# Patient Record
Sex: FEMALE | Race: WHITE | URBAN - METROPOLITAN AREA
[De-identification: names, ages, dates, MRNs, and addresses within clinical notes are randomized per-mention and may not be internally consistent; named-entity substitution may affect disease eponyms.]

---

## 2020-01-30 ENCOUNTER — OFFICE VISIT (OUTPATIENT)
Dept: ENDOCRINOLOGY | Age: 30
End: 2020-01-30

## 2020-01-30 VITALS
BODY MASS INDEX: 27.32 KG/M2 | SYSTOLIC BLOOD PRESSURE: 106 MMHG | RESPIRATION RATE: 16 BRPM | DIASTOLIC BLOOD PRESSURE: 61 MMHG | WEIGHT: 170 LBS | HEART RATE: 86 BPM | OXYGEN SATURATION: 100 % | HEIGHT: 66 IN

## 2020-01-30 DIAGNOSIS — E03.9 ACQUIRED HYPOTHYROIDISM: Primary | ICD-10-CM

## 2020-01-30 RX ORDER — NITROFURANTOIN 25; 75 MG/1; MG/1
CAPSULE ORAL
COMMUNITY
End: 2020-01-30

## 2020-01-30 RX ORDER — CITALOPRAM 20 MG/1
TABLET, FILM COATED ORAL
COMMUNITY
End: 2020-01-30

## 2020-01-30 RX ORDER — LEVOTHYROXINE SODIUM 50 UG/1
TABLET ORAL
COMMUNITY
Start: 2020-01-20 | End: 2020-11-09

## 2020-01-30 NOTE — PROGRESS NOTES
Chief Complaint   Patient presents with    Thyroid Problem     Pharmacy veriifed: [facility]    New Patient     History of Present Illness: Darlin Leiva is a 26 y.o. female who I was asked to see in consult by Dr. Yumiko Villanueva for evaluation of hypothyroidism. She was previously seen by Dr. Corby Darby (will request records from Dr. Lily Cordero). \"After I had my son in [address] [place] [address] I started to have hair loss and weight loss, they tested my thyroid and I had hyperthyroidism. I was hyperthyroid for a while then it switched to hypothyroidism. They tested me for Hashimoto's which was positive. They put me on Synthroid, which I have been taking for about 5 months. After I was started on the Synthroid I started to sleep better and my BM were better. I then started to have issues of foggy headedness and anxiety, but my doctor said it was not related so I kept taking it\". Her last set of TFTs was 9/25/19 and her TSH was 1.42 with FT4 of 1.41. She is taking Synthroid 50mcg daily. She notes that she has been on the Synthroid 50mcg the whole time. Prior to starting the Synthroid her only issue was sluggish bowel movements and not sleeping well. She denies issues of CP, SOB, palpitations, tremors, diarrhea, constipation, heat or cold intolerance. She denies issues of dysphagia, dysphonia, or chocking issues. Prior to January 2018 she did not have prior thyroid issues. She notes that her mother has \"thyroid issues\". Now that she is on the Synthroid she notes that she has been feeling foggy headed with Kelsea Shook out of it feeling\" and increased sensation of anxiety. She denies issues of weight gain or weight loss. Her LMP was 1/10/2020, she notes her cycle is regular in timing and duration. Past Medical History:   Diagnosis Date    Thyroiditis, chronic, lymphocytic      History reviewed. No pertinent surgical history.   Current Outpatient Medications   Medication Sig    SYNTHROID 50 mcg tablet TAKE 1 TABLET BY MOUTH EVERY DAY     No current facility-administered medications for this visit. No Known Allergies  Family History   Problem Relation Age of Onset    Thyroid Disease Mother     No Known Problems Father     No Known Problems Brother     Cancer Maternal Grandmother         Lung     Social History     Socioeconomic History    Marital status: UNKNOWN     Spouse name: Not on file    Number of children: Not on file    Years of education: Not on file    Highest education level: Not on file   Occupational History    Not on file   Social Needs    Financial resource strain: Not on file    Food insecurity:     Worry: Not on file     Inability: Not on file    Transportation needs:     Medical: Not on file     Non-medical: Not on file   Tobacco Use    Smoking status: Former Smoker     Last attempt to quit:      Years since quittin.0    Smokeless tobacco: Never Used   Substance and Sexual Activity    Alcohol use: Yes     Alcohol/week: 1.0 standard drinks     Types: 1 Glasses of wine per week     Comment: weekly    Drug use: Never    Sexual activity: Not on file   Lifestyle    Physical activity:     Days per week: Not on file     Minutes per session: Not on file    Stress: Not on file   Relationships    Social connections:     Talks on phone: Not on file     Gets together: Not on file     Attends Jainism service: Not on file     Active member of club or organization: Not on file     Attends meetings of clubs or organizations: Not on file     Relationship status: Not on file    Intimate partner violence:     Fear of current or ex partner: Not on file     Emotionally abused: Not on file     Physically abused: Not on file     Forced sexual activity: Not on file   Other Topics Concern    Not on file   Social History Narrative    Not on file     Review of Systems:  - Negative except as noted in HPI    Physical Examination:  Blood pressure 106/61, pulse 86, resp.  rate 16, height 5' 6\" (1.676 m), weight 170 lb (77.1 kg), SpO2 100 %. - General: pleasant, no distress, good eye contact  - HEENT: no exopthalmos, no periorbital edema, no scleral/conjunctival injection, EOMI, no lid lag or stare  - Neck: supple, no thyromegaly, masses, lymph nodes, or carotid bruits, no supraclavicular or dorsocervical fat pads  - Cardiovascular: regular, normal rate, normal S1 and S2, no murmurs/rubs/gallops   - Respiratory: clear to auscultation bilaterally  - Gastrointestinal: soft, nontender, nondistended, no masses, no hepatosplenomegaly  - Musculoskeletal: no proximal muscle weakness in upper or lower extremities  - Integumentary: no acanthosis nigricans, no rashes, no edema  - Neurological: reflexes 2+ at biceps, no tremor  - Psychiatric: normal mood and affect    Data Reviewed:   (See HPI and scanned lab documents)    Assessment/Plan:   1. Acquired hypothyroidism    1) Pt reports that since being started on the Synthroid 50mcg she has been experiencing anxiety and \"foggy headedness\". We discussed that yes her TFTs in September were in a good normal range, but she is still having symptoms that started after she started the Synthroid. Will check TFTs today and we discussed possibly decreasing her dose or trying her off the Synthroid since her only symptoms were constipation and trouble sleeping, which have resolved. Pt voices understanding and agreement with the plan. RTC 3 months      Follow-up and Dispositions    · Return in about 3 months (around 4/30/2020).          Copy sent to:  Dr. Patricia Potter

## 2020-01-30 NOTE — LETTER
1/30/2020 10:13 AM 
 
Patient:  Nate Guerrero YOB: 1990 Date of Visit: 1/30/2020 Dear Beryle Gelineau, MD, [id number] [facility] [address] [id number] 
 : Thank you for referring Ms. Nate Guerrero to me for evaluation/treatment. Below are the relevant portions of my assessment and plan of care. If you have questions, please do not hesitate to call me. I look forward to following Ms. Chun Mcgill along with you. Sincerely, Eric Green MD

## 2020-01-31 LAB
FT4I SERPL CALC-MCNC: 2 (ref 1.2–4.9)
T3RU NFR SERPL: 29 % (ref 24–39)
T4 FREE SERPL-MCNC: 1.45 NG/DL (ref 0.82–1.77)
T4 SERPL-MCNC: 6.9 UG/DL (ref 4.5–12)
TSH SERPL DL<=0.005 MIU/L-ACNC: 1.32 UIU/ML (ref 0.45–4.5)

## 2020-01-31 NOTE — PROGRESS NOTES
Spoke with pt regarding her TFTs. Pt instructed to stop her Synthroid 50mcg for the next three months. Pt voices understanding and agreement with the plan.

## 2020-04-30 DIAGNOSIS — E03.9 ACQUIRED HYPOTHYROIDISM: ICD-10-CM

## 2020-05-07 LAB
FT4I SERPL CALC-MCNC: 1.8 (ref 1.2–4.9)
T3RU NFR SERPL: 28 % (ref 24–39)
T4 FREE SERPL-MCNC: 1.2 NG/DL (ref 0.82–1.77)
T4 SERPL-MCNC: 6.4 UG/DL (ref 4.5–12)
TSH SERPL DL<=0.005 MIU/L-ACNC: 3.13 UIU/ML (ref 0.45–4.5)

## 2020-05-13 ENCOUNTER — VIRTUAL VISIT (OUTPATIENT)
Dept: ENDOCRINOLOGY | Age: 30
End: 2020-05-13

## 2020-05-13 DIAGNOSIS — E03.9 ACQUIRED HYPOTHYROIDISM: Primary | ICD-10-CM

## 2020-05-13 RX ORDER — BISMUTH SUBSALICYLATE 262 MG
1 TABLET,CHEWABLE ORAL DAILY
COMMUNITY
End: 2020-11-09

## 2020-05-13 NOTE — PROGRESS NOTES
Chief Complaint   Patient presents with    Thyroid Problem     pcp and pharmacy verified . DOXY. ME            **THIS IS A VIRTUAL VISIT VIA A VIDEO ENCOUNTER. PATIENT AGREED TO HAVE THEIR CARE DELIVERED OVER VIDEO IN PLACE OF THEIR REGULARLY SCHEDULED OFFICE VISIT**      History of Present Illness: Unique Plasencia is a 26 y.o. female here for follow up of hypothyroidism. \"After I had my son in [address] [place] [address] I started to have hair loss and weight loss, they tested my thyroid and I had hyperthyroidism. I was hyperthyroid for a while then it switched to hypothyroidism. They tested me for Hashimoto's which was positive. They put me on Synthroid\". At our initial visit in January 2020 she notes that she has begun to experience foggy headedness, and anxiety and well as an \"out of it feeling\". She was taking Synthroid 50mcg daily. Her TSH was 1.32 with FT4 of 6.9 and FT4 of 1.45    I recommended she stop taking the Synthroid and we would repeat her labs after being off for 6 weeks to see if she began to feel better. Pt has been off the Synthroid since January 2020. She notes that she is sleeping better, she is no longer experiencing the foggy headedness, or anxiety. She does have \"sluggish bowel movements\", but otherwise fells better overall. Her TSH last week was 3.13 with TT4 of 6.4 and FT4 of 1.20. She denies issues of CP, SOB, palpitations, tremors, diarrhea, heat or cold intolerance. She denies issues of dysphagia, dysphonia, or chocking issues. Her LMP was 2 weeks ago, she notes her cycle is regular in timing and duration. Current Outpatient Medications   Medication Sig    multivitamin (ONE A DAY) tablet Take 1 Tab by mouth daily.  SYNTHROID 50 mcg tablet TAKE 1 TABLET BY MOUTH EVERY DAY     No current facility-administered medications for this visit.       No Known Allergies  Review of Systems:  - Cardiovascular: no chest pain  - Neurological: no tremors  - Integumentary: skin is normal    Physical Examination:  There were no vitals taken for this visit. - GENERAL: NCAT, Appears well nourished   - EYES: EOMI, non-icteric, no proptosis   - Ear/Nose/Throat: NCAT, no visible inflammation or masses   - CARDIOVASCULAR: no cyanosis, no visible JVD   - RESPIRATORY: respiratory effort normal without any distress or labored breathing   - MUSCULOSKELETAL: Normal ROM of neck and upper extremities observed   - SKIN: No rash on face   - NEUROLOGIC:  No facial asymmetry (Cranial nerve 7 motor function), No gaze palsy   - PSYCHIATRIC: Normal affect, Normal insight and judgement   -     Data Reviewed:   Component      Latest Ref Rng & Units 5/6/2020 5/6/2020 1/30/2020 1/30/2020           4:32 PM  4:32 PM 10:42 AM 10:42 AM   TSH      0.450 - 4.500 uIU/mL  3.130  1.320   T4, Total      4.5 - 12.0 ug/dL  6.4  6.9   T3 Uptake      24 - 39 %  28  29   Free Thyroxine Index (FTI)      1.2 - 4.9  1.8  2.0   T4, Free      0.82 - 1.77 ng/dL 1.20  1.45        Assessment/Plan:   1) Hypothyroidism > Off the Synthroid her TSH is in a normal range, her TT4 has not changed and her FT4 is still in a good normal range. This tells me that her thyroid gland is functioning well, and that when she was on then Synthroid 50mcg it was likely too much or was the etiology for her anxiety and foggy headedness. Pt instructed to stay off the Synthroid and will see her again in 6 months in follow up and repeat TFTs to see if she is still euthyroid. Pt voices understanding and agreement with the plan.     RTC 6 months          Copy sent to:  Dr. Jhon Castanon

## 2020-11-02 DIAGNOSIS — E03.9 ACQUIRED HYPOTHYROIDISM: ICD-10-CM

## 2020-11-04 LAB
FT4I SERPL CALC-MCNC: 2.1 (ref 1.2–4.9)
T3RU NFR SERPL: 31 % (ref 24–39)
T4 FREE SERPL-MCNC: 1.27 NG/DL (ref 0.82–1.77)
T4 SERPL-MCNC: 6.7 UG/DL (ref 4.5–12)
THYROGLOB AB SERPL-ACNC: 4.6 IU/ML (ref 0–0.9)
THYROPEROXIDASE AB SERPL-ACNC: 355 IU/ML (ref 0–34)
TSH SERPL DL<=0.005 MIU/L-ACNC: 4.15 UIU/ML (ref 0.45–4.5)

## 2020-11-09 ENCOUNTER — VIRTUAL VISIT (OUTPATIENT)
Dept: ENDOCRINOLOGY | Age: 30
End: 2020-11-09
Payer: COMMERCIAL

## 2020-11-09 DIAGNOSIS — E03.9 ACQUIRED HYPOTHYROIDISM: Primary | ICD-10-CM

## 2020-11-09 PROCEDURE — 99213 OFFICE O/P EST LOW 20 MIN: CPT | Performed by: INTERNAL MEDICINE

## 2020-11-09 NOTE — PROGRESS NOTES
Chief Complaint   Patient presents with    Thyroid Problem     PCP and Pharmacy verified    Other     Doxy: [phone] ([facility])            **THIS IS A VIRTUAL VISIT VIA A VIDEO ENCOUNTER. PATIENT AGREED TO HAVE THEIR CARE DELIVERED OVER VIDEO IN PLACE OF THEIR REGULARLY SCHEDULED OFFICE VISIT**      History of Present Illness: Emile Tahcker is a 31 y.o. female here for follow up of hypothyroidism. \"After I had my son in [address] [place] [address] I started to have hair loss and weight loss, they tested my thyroid and I had hyperthyroidism. I was hyperthyroid for a while then it switched to hypothyroidism. They tested me for Hashimoto's which was positive. They put me on Synthroid\". At our initial visit in January 2020 she notes that she has begun to experience foggy headedness, and anxiety and well as an \"out of it feeling\". She was taking Synthroid 50mcg daily. Her TSH was 1.32 with FT4 of 6.9 and FT4 of 1.45    I recommended she stop taking the Synthroid and we would repeat her labs after being off for 6 weeks to see if she began to feel better. Pt has been off the Synthroid since January 2020. She notes that she is sleeping better, she is no longer experiencing the foggy headedness, or anxiety. She does have \"sluggish bowel movements\", but otherwise fells better overall. Her TSH last week was 4.150 with TT4 of 6.7 and FT4 of 1.27. Her TPO was 355 and her TgAb was 4.6. She denies any recent illnesses, injuries or hospitalizations. She has been off the LT4 since January 2020. Pt notes that she is feeling well overall. She denies issues of CP, SOB, palpitations, tremors, diarrhea, heat or cold intolerance. She denies issues of dysphagia, dysphonia, or chocking issues. Her LMP was 10/6/20, she notes her cycle is regular in timing and duration. Pt has no plans for future fertility. \"We have two and we are good. \"    No current outpatient medications on file.      No current facility-administered medications for this visit. No Known Allergies  Review of Systems:  - Cardiovascular: no chest pain  - Neurological: no tremors  - Integumentary: skin is normal    Physical Examination:  There were no vitals taken for this visit. - GENERAL: NCAT, Appears well nourished   - EYES: EOMI, non-icteric, no proptosis   - Ear/Nose/Throat: NCAT, no visible inflammation or masses   - CARDIOVASCULAR: no cyanosis, no visible JVD   - RESPIRATORY: respiratory effort normal without any distress or labored breathing   - MUSCULOSKELETAL: Normal ROM of neck and upper extremities observed   - SKIN: No rash on face   - NEUROLOGIC:  No facial asymmetry (Cranial nerve 7 motor function), No gaze palsy   - PSYCHIATRIC: Normal affect, Normal insight and judgement   -     Data Reviewed:   Component      Latest Ref Rng & Units 11/3/2020 11/3/2020 11/3/2020           4:33 PM  4:33 PM  4:33 PM   TSH      0.450 - 4.500 uIU/mL   4.150   T4, Total      4.5 - 12.0 ug/dL   6.7   T3 Uptake      24 - 39 %   31   Free Thyroxine Index (FTI)      1.2 - 4.9   2.1   Thyroid peroxidase Ab      0 - 34 IU/mL 355 (H)     Thyroglobulin Ab      0.0 - 0.9 IU/mL 4.6 (H)     T4, Free      0.82 - 1.77 ng/dL  1.27        Assessment/Plan:   1) Hypothyroidism > Off the Synthroid her TSH is still in a normal range. Her TSH was 4.150 with TT4 6.7 and FT4 1.27. Pt is clinically euthyroid and she has no plans for future fertility. We agreed to repeat her TFTs in 6 months to continue to monitor, since her Thyroid Ab panel was positive. Pt voices understanding and agreement with the plan.     RTC 6 months          Copy sent to:  Dr. Nova Resendiz

## 2021-04-26 DIAGNOSIS — E03.9 ACQUIRED HYPOTHYROIDISM: ICD-10-CM

## 2021-05-07 LAB
FT4I SERPL CALC-MCNC: 1.5 (ref 1.2–4.9)
T3RU NFR SERPL: 26 % (ref 24–39)
T4 FREE SERPL-MCNC: 1.12 NG/DL (ref 0.82–1.77)
T4 SERPL-MCNC: 5.6 UG/DL (ref 4.5–12)
TSH SERPL DL<=0.005 MIU/L-ACNC: 2.51 UIU/ML (ref 0.45–4.5)